# Patient Record
Sex: FEMALE | Race: WHITE | NOT HISPANIC OR LATINO
[De-identification: names, ages, dates, MRNs, and addresses within clinical notes are randomized per-mention and may not be internally consistent; named-entity substitution may affect disease eponyms.]

---

## 2022-03-07 ENCOUNTER — RESULT REVIEW (OUTPATIENT)
Age: 57
End: 2022-03-07

## 2022-03-07 ENCOUNTER — APPOINTMENT (OUTPATIENT)
Dept: ORTHOPEDIC SURGERY | Facility: CLINIC | Age: 57
End: 2022-03-07

## 2022-03-07 ENCOUNTER — OUTPATIENT (OUTPATIENT)
Dept: OUTPATIENT SERVICES | Facility: HOSPITAL | Age: 57
LOS: 1 days | End: 2022-03-07
Payer: COMMERCIAL

## 2022-03-07 VITALS
SYSTOLIC BLOOD PRESSURE: 103 MMHG | WEIGHT: 122 LBS | OXYGEN SATURATION: 97 % | HEART RATE: 74 BPM | HEIGHT: 68 IN | DIASTOLIC BLOOD PRESSURE: 66 MMHG | BODY MASS INDEX: 18.49 KG/M2

## 2022-03-07 DIAGNOSIS — Z78.9 OTHER SPECIFIED HEALTH STATUS: ICD-10-CM

## 2022-03-07 DIAGNOSIS — Z82.61 FAMILY HISTORY OF ARTHRITIS: ICD-10-CM

## 2022-03-07 DIAGNOSIS — M75.81 OTHER SHOULDER LESIONS, RIGHT SHOULDER: ICD-10-CM

## 2022-03-07 DIAGNOSIS — M75.41 IMPINGEMENT SYNDROME OF RIGHT SHOULDER: ICD-10-CM

## 2022-03-07 PROBLEM — Z00.00 ENCOUNTER FOR PREVENTIVE HEALTH EXAMINATION: Status: ACTIVE | Noted: 2022-03-07

## 2022-03-07 PROCEDURE — 20610 DRAIN/INJ JOINT/BURSA W/O US: CPT

## 2022-03-07 PROCEDURE — 73030 X-RAY EXAM OF SHOULDER: CPT | Mod: 26,RT

## 2022-03-07 PROCEDURE — 99204 OFFICE O/P NEW MOD 45 MIN: CPT | Mod: 25

## 2022-03-07 PROCEDURE — 73030 X-RAY EXAM OF SHOULDER: CPT

## 2022-03-07 NOTE — PHYSICAL EXAM
[de-identified] : Right shoulder  ER 90 IR T 12\par Positive Neer Positive Santos Speeds' test positive: Isometric strength:  ER 5/5 subscap 5/5  SS 4/5 [de-identified] : 4 views of the right shoulder show normal bony anatomy , no head elevation some minimal joint narrowing AC joint. \par

## 2022-03-07 NOTE — PROCEDURE
[de-identified] : The right shoulder was prepped with alchohol and ethyl chloride was used to numb the skin. A 6 cc injection with 3 cc xylocaine, 2 cc sensoricaine and 1 cc depomedrol , was given without complication into the Sub acromial space. Patient instructed that there may be an inflammatory flare for 24 hrs , to use ice or advil if needed\par

## 2022-03-07 NOTE — HISTORY OF PRESENT ILLNESS
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 Pain comes with overhead activity and reaching behind her back. Still maintains full ROM. \par No recent treatment. 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

## 2022-03-07 NOTE — DISCUSSION/SUMMARY
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 \par Injection test: took away pain and brought back strength of SS to isometric testing. \par She has history of impingement and frozen shoulder in the same shoulder in the  past no previous MRI available. \par Probable partial SS strain vs severe tendonitis \par Plan:\par Injection SA today plus Physical Therapy for RC and scapular stabilization\par She may continue to work and perform stunts with the following restrictions. \par Avoid falling backwards on right arm, no lifting overhead or lift/ carry more than 15 pounds.with right arm.

## 2022-04-25 ENCOUNTER — APPOINTMENT (OUTPATIENT)
Dept: ORTHOPEDIC SURGERY | Facility: CLINIC | Age: 57
End: 2022-04-25

## 2022-04-28 ENCOUNTER — RX RENEWAL (OUTPATIENT)
Age: 57
End: 2022-04-28

## 2022-06-06 ENCOUNTER — RX RENEWAL (OUTPATIENT)
Age: 57
End: 2022-06-06

## 2022-08-08 ENCOUNTER — RX RENEWAL (OUTPATIENT)
Age: 57
End: 2022-08-08

## 2022-08-10 ENCOUNTER — RX RENEWAL (OUTPATIENT)
Age: 57
End: 2022-08-10

## 2022-09-06 ENCOUNTER — RX RENEWAL (OUTPATIENT)
Age: 57
End: 2022-09-06

## 2022-10-03 ENCOUNTER — RX RENEWAL (OUTPATIENT)
Age: 57
End: 2022-10-03

## 2022-10-03 RX ORDER — CELECOXIB 200 MG/1
200 CAPSULE ORAL
Qty: 30 | Refills: 0 | Status: ACTIVE | COMMUNITY
Start: 2022-06-06 | End: 1900-01-01

## 2023-12-20 ENCOUNTER — NON-APPOINTMENT (OUTPATIENT)
Age: 58
End: 2023-12-20

## 2024-03-05 ENCOUNTER — APPOINTMENT (OUTPATIENT)
Dept: ORTHOPEDIC SURGERY | Facility: CLINIC | Age: 59
End: 2024-03-05
Payer: COMMERCIAL

## 2024-03-05 DIAGNOSIS — M70.61 TROCHANTERIC BURSITIS, RIGHT HIP: ICD-10-CM

## 2024-03-05 PROCEDURE — 72020 X-RAY EXAM OF SPINE 1 VIEW: CPT

## 2024-03-05 PROCEDURE — 99214 OFFICE O/P EST MOD 30 MIN: CPT

## 2024-03-05 PROCEDURE — 72170 X-RAY EXAM OF PELVIS: CPT

## 2024-03-05 NOTE — HISTORY OF PRESENT ILLNESS
[de-identified] : FAREED AZUL 58-year female seen in 2022 for right shoulder which is now better. she is here today for a new issue which is right hip pain that started 6 months ago. The pain came out of nowhere and is a constant achy pain at the greater torchanter with no radicular pain no paraesthesias.  Recen bone scan suggested osteopenia but not osteoporsis.

## 2024-03-05 NOTE — DISCUSSION/SUMMARY
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  If no better then will get MRI to rule out abductor/gluteus strain and then decide on cortisone vs prp injection

## 2024-03-05 NOTE — PHYSICAL EXAM
[de-identified] : Right hip exam full ROM flexion extension internal external rotation.  N-v exam wnl and neg straight leg raise.  Tender at greater trochanter and weakness to hip abduction on isometric testing Neg Chinedu and Fadir.   [de-identified] : Lateral lumbar x-ray : minimal DDD no acute changes  Right hip no DJD or pelvic or bony abnormality on AP pelvis.

## 2024-03-29 ENCOUNTER — RX RENEWAL (OUTPATIENT)
Age: 59
End: 2024-03-29

## 2024-03-29 RX ORDER — CELECOXIB 200 MG/1
200 CAPSULE ORAL
Qty: 30 | Refills: 0 | Status: ACTIVE | COMMUNITY
Start: 2024-03-05 | End: 1900-01-01

## 2024-04-22 ENCOUNTER — APPOINTMENT (OUTPATIENT)
Dept: ORTHOPEDIC SURGERY | Facility: CLINIC | Age: 59
End: 2024-04-22